# Patient Record
Sex: FEMALE | Race: BLACK OR AFRICAN AMERICAN | URBAN - METROPOLITAN AREA
[De-identification: names, ages, dates, MRNs, and addresses within clinical notes are randomized per-mention and may not be internally consistent; named-entity substitution may affect disease eponyms.]

---

## 2023-03-09 ENCOUNTER — WEB ENCOUNTER (OUTPATIENT)
Dept: URBAN - METROPOLITAN AREA CLINIC 82 | Facility: CLINIC | Age: 63
End: 2023-03-09

## 2023-03-09 ENCOUNTER — DASHBOARD ENCOUNTERS (OUTPATIENT)
Age: 63
End: 2023-03-09

## 2023-03-09 ENCOUNTER — OFFICE VISIT (OUTPATIENT)
Dept: URBAN - METROPOLITAN AREA CLINIC 82 | Facility: CLINIC | Age: 63
End: 2023-03-09
Payer: COMMERCIAL

## 2023-03-09 VITALS
SYSTOLIC BLOOD PRESSURE: 113 MMHG | BODY MASS INDEX: 35.97 KG/M2 | RESPIRATION RATE: 20 BRPM | HEART RATE: 90 BPM | DIASTOLIC BLOOD PRESSURE: 76 MMHG | TEMPERATURE: 97.6 F | HEIGHT: 63 IN | WEIGHT: 203 LBS

## 2023-03-09 DIAGNOSIS — Z12.11 SCREENING FOR COLON CANCER: ICD-10-CM

## 2023-03-09 DIAGNOSIS — R10.32 LLQ ABDOMINAL PAIN: ICD-10-CM

## 2023-03-09 PROCEDURE — 99204 OFFICE O/P NEW MOD 45 MIN: CPT | Performed by: INTERNAL MEDICINE

## 2023-03-09 PROCEDURE — 99244 OFF/OP CNSLTJ NEW/EST MOD 40: CPT | Performed by: INTERNAL MEDICINE

## 2023-03-09 RX ORDER — CHLORTHALIDONE 25 MG/1
TAKE 1 TABLET (25 MG) BY ORAL ROUTE ONCE DAILY TABLET ORAL 1
Qty: 0 | Refills: 0 | Status: ACTIVE | COMMUNITY
Start: 1900-01-01

## 2023-03-09 NOTE — HPI-TODAY'S VISIT:
This patient was referred by Dr. Bunny Colon for an evaluation of LLQ abdominal pain.  A copy of this will be sent to the referring provider.  The patient complains of months of LLQ abdominal pain that only is exascerbated by movements and changes in position.  Had colonoscopy 10 years ago that was normal.  CT scan was normal a few weeks ago where it was normal.

## 2023-04-17 ENCOUNTER — OFFICE VISIT (OUTPATIENT)
Dept: URBAN - METROPOLITAN AREA SURGERY CENTER 13 | Facility: SURGERY CENTER | Age: 63
End: 2023-04-17

## 2023-05-09 ENCOUNTER — OFFICE VISIT (OUTPATIENT)
Dept: URBAN - METROPOLITAN AREA SURGERY CENTER 13 | Facility: SURGERY CENTER | Age: 63
End: 2023-05-09
Payer: COMMERCIAL

## 2023-05-09 DIAGNOSIS — Z12.11 SCREENING FOR COLON CANCER: ICD-10-CM

## 2023-05-09 PROCEDURE — G8907 PT DOC NO EVENTS ON DISCHARG: HCPCS | Performed by: INTERNAL MEDICINE

## 2023-05-09 PROCEDURE — G0121 COLON CA SCRN NOT HI RSK IND: HCPCS | Performed by: INTERNAL MEDICINE

## 2025-02-17 NOTE — PHYSICAL EXAM MUSCULOSKELETAL:
Letty Bass is a 80 year old female presenting for   Chief Complaint   Patient presents with    Office Visit    Medicare Wellness Visit     Subsequent     Denies Latex allergy or sensitivity.    Medication verified and med list updated  Refills needed today: No       Health Maintenance       Microalbumin Ratio (Yearly)  Never done    Shingles Vaccine (2 of 3)  Overdue since 2/6/2013    Respiratory Syncytial Virus (RSV) Vaccine 60+ (1 - 1-dose 75+ series)  Never done    Influenza Vaccine (1)  Overdue since 9/1/2024    COVID-19 Vaccine (7 - 2024-25 season)  Overdue since 9/1/2024    Diabetes Foot Exam (Yearly)  Overdue since 10/3/2024    Depression Screening (Yearly)  Overdue since 10/28/2024    GFR (Yearly)  Overdue since 11/1/2024    Diabetes A1C (Every 6 Months)  Overdue since 1/10/2025    Medicare Advantage- Medicare Wellness Visit (Yearly - January to December)  Due since 1/1/2025           Following review of the above:  Patient is not proceeding with: Respiratory Syncytial Virus (RSV) and Shingles    Note: Refer to final orders and clinician documentation.            Unaddressed Risk Adjusted HCC Categories and Diagnoses    CMS V28 37 - Diabetes with Chronic Complications  - Unaddressed Dx:Type 2 Diabetes Mellitus With Diabetic Cataract (Cmd)        CMS V28 238 - Specified Heart Arrhythmias  - Unaddressed Dx:Atrial Flutter, Unspecified Type (Cmd)              Last lab results:   Hemoglobin A1C (%)   Date Value   03/05/2024 8.0 (H)     Cholesterol (mg/dL)   Date Value   10/29/2023 107     HDL (mg/dL)   Date Value   10/29/2023 47 (L)     Triglycerides (mg/dL)   Date Value   10/29/2023 82     LDL (mg/dL)   Date Value   10/29/2023 44     MICROALBUMIN, UA (TTL) (mg/dL)   Date Value   11/21/2019 4.55     Creatinine, Urine (mg/dL)   Date Value   06/04/2021 82.70     Microalbumin/ Creatinine Ratio (mg/g)   Date Value   06/04/2021 51.9 (H)     Lab Results   Component Value Date/Time    IFOB Negative 03/23/2022 03:38 PM  normal gait and station , no tenderness or deformities present                    Depression Screening:  Review Flowsheet  More data exists         10/28/2023   PHQ 2/9 Score   Adult PHQ 2 Score 0   Adult PHQ 2 Interpretation No further screening needed   Little interest or pleasure in activity? Not at all   Feeling down, depressed or hopeless? Not at all      Details                    Advance Directives:  patient has a completed AD document at home, was advised to (or will) provide a copy to [facility]